# Patient Record
Sex: MALE | Race: BLACK OR AFRICAN AMERICAN | NOT HISPANIC OR LATINO | URBAN - METROPOLITAN AREA
[De-identification: names, ages, dates, MRNs, and addresses within clinical notes are randomized per-mention and may not be internally consistent; named-entity substitution may affect disease eponyms.]

---

## 2023-03-20 ENCOUNTER — OUTPATIENT (OUTPATIENT)
Dept: OUTPATIENT SERVICES | Facility: HOSPITAL | Age: 53
LOS: 1 days | End: 2023-03-20

## 2023-03-20 ENCOUNTER — APPOINTMENT (OUTPATIENT)
Dept: CT IMAGING | Facility: CLINIC | Age: 53
End: 2023-03-20
Payer: COMMERCIAL

## 2023-03-20 PROCEDURE — 75574 CT ANGIO HRT W/3D IMAGE: CPT | Mod: 26

## 2023-03-21 PROBLEM — Z00.00 ENCOUNTER FOR PREVENTIVE HEALTH EXAMINATION: Status: ACTIVE | Noted: 2023-03-21

## 2023-08-01 VITALS
RESPIRATION RATE: 16 BRPM | DIASTOLIC BLOOD PRESSURE: 83 MMHG | HEART RATE: 66 BPM | OXYGEN SATURATION: 98 % | WEIGHT: 167.99 LBS | HEIGHT: 68 IN | SYSTOLIC BLOOD PRESSURE: 143 MMHG | TEMPERATURE: 98 F

## 2023-08-01 NOTE — H&P ADULT - NSHPLABSRESULTS_GEN_ALL_CORE
12.8   5.85  )-----------( 202      ( 04 Aug 2023 07:27 )             38.3       08-04    142  |  106  |  11  ----------------------------<  111<H>  3.6   |  25  |  1.02    Ca    8.7      04 Aug 2023 07:27  Mg     1.7     08-04    TPro  7.1  /  Alb  4.3  /  TBili  0.2  /  DBili  x   /  AST  16  /  ALT  15  /  AlkPhos  61  08-04      PT/INR - ( 04 Aug 2023 07:27 )   PT: 11.1 sec;   INR: 0.97          PTT - ( 04 Aug 2023 07:27 )  PTT:29.3 sec          Urinalysis Basic - ( 04 Aug 2023 07:27 )    Color: x / Appearance: x / SG: x / pH: x  Gluc: 111 mg/dL / Ketone: x  / Bili: x / Urobili: x   Blood: x / Protein: x / Nitrite: x   Leuk Esterase: x / RBC: x / WBC x   Sq Epi: x / Non Sq Epi: x / Bacteria: x        EKG: NSR, no acute findings

## 2023-08-01 NOTE — H&P ADULT - ASSESSMENT
53 yo M, current everyday smoker, with a PMH of HTN, HLD, anomalous RCA, PAD with previous (L) SFA CSI/PTA/MILLI, (L) prox popiteal 80% s/p CSI PTA, (L) ANGEL 80-90% CSI/PTA and (R) EIA s/p PTA with residual (L) EIA disease who presented to outpatient cardiologist Dr. Armstrong with complaints of moderate GARCIA to light exertion, relieved w/ rest over the past few months and severe b/l LE pain/heaviness/weakness on ambulation of <1 block, with no relief. Pain is worse in the R vs L leg currently despite therapy w/ ASA/Cilostazol and exercise Rx. patient was sent for PINA and LE Duplex which were abnormal b/l (result below). In light of patients risk factors, [place] class IV sx and known residual disease, patient is referred for peripheral angiogram with planned intervention of known residual disease.     -Pt H+H, platelets stable, Pt without any reports of BRBPR, hematuria, prior ICH, melena and no recent or previous GI bleed.   -Loaded with: [x ]81mg ASA, [x ]75mg Plavix,  [ ] ASA 325mg [ ] Plavix 600mg, [ ] No Load [ ]. due to pt reporting compliance with aspirin (in last 4 days) and plavix (however, missed one dose in last 8 days). Per MD, okay to load with maintenance dose aspirin 81mg po x 1 and plavix 75mg po x 1.   -Pt Cr. stable- and LVEF 50% (per echo 12/2022, and EF 48% per NST in 2/23), pre cath fluids ordered per protocol [x]250ml IV bolus over 30 min, [x ] 75cc x2hrs, [ ] 50cc x2hrs, Patient euvolemic on exam.   -Malampatti II  -ASA III    Pt is a Candidate for Moderate Sedation, yes    Risks & benefits of procedure and alternative therapy have been explained to the patient including but not limited to: allergic reaction, bleeding w/possible need for blood transfusion, infection, renal and vascular compromise, limb damage, arrhythmia, stroke, vessel dissection/perforation, Myocardial infarction, emergent CABG. Informed consent obtained and in chart

## 2023-08-01 NOTE — H&P ADULT - HISTORY OF PRESENT ILLNESS
Cardiologist: Dr. Armstrong  Escort:  Pharmacy: [facility] [phone]    *Verify Meds*    53 yo M, current everyday smoker, with a PMH of HTN, HLD, anomalous RCA, PAD with previous known  who presented to outpatient cardiologist Dr. Armstrong with complaints of moderate GARCIA to light exertion, relieved w/ rest over the past few months and severe b/l LE pain/heaviness/weakness on ambulation of <1 block, with no relief    Peripheral Angiogram (Left) 1/19/23 : L EIA 80%, Common femoral mild, Profunda mild, femoral artery 100% stenosis/calcific, prox popiteal 80%, p/d ANGEL 80-90%, PTA TP trunk 90%, mPTA 90%, Peroneal artery 100% stenosis. INTERVENTION: (L) SFA CSI/PTA/MILLI, (L) prox popiteal 80% s/p CSI PTA, (L) ANGEL 80-90% CSI/PTA. Plan to c/w therapy of ASA/Plavix (stopped Xarelto) w/ staged (L) EIA once clinically appropriate    PINA 6/29/23: (RIGHT) Resting PINA and waveforms demonstrate mild (0.7-0.89) arterial disease at rest, TBI demonstrates no evidence of arterial disease. (LEFT) Resting PINA and waveforms demonstrate no significant evidence of arterial disease at rest; TBI demonstrates no evidence of arterial disease. Rec LE Duplex.    Lower Extremity Arterial Duplex 6/29/23: moderate 50-75% stenosis in (R) common femoral, deep femoral, posterior tibial and peroneal artery. Severe stenosis 75-99% stenosis in (R) superficial femoral artery and distal anterior tibial artery, occluded stenosis in the (R) anterior tibial artery, moderate 50-75% stenosis in (L) popiteal artery, occluded stenosis (L) peroneal, sterial dissection from (R) EIA to SFA/PFA bifurcation patent (L) SFA stent    Echocardiogram 12/1/22: EF 65%,  Cardiologist: Dr. Armstrong  Escort:  Pharmacy: [facility] [phone]    *Verify Meds*    51 yo M, current everyday smoker, with a PMH of HTN, HLD, anomalous RCA, PAD with previous (L) SFA CSI/PTA/MILLI, (L) prox popiteal 80% s/p CSI PTA, (L) ANGEL 80-90% CSI/PTA with residual (L) EIA disease who presented to outpatient cardiologist Dr. Armstrong with complaints of moderate GARCIA to light exertion, relieved w/ rest over the past few months and severe b/l LE pain/heaviness/weakness on ambulation of <1 block, with no relief    Peripheral Angiogram (Left) 1/19/23 : L EIA 80%, Common femoral mild, Profunda mild, femoral artery 100% stenosis/calcific, prox popiteal 80%, p/d ANGEL 80-90%, PTA TP trunk 90%, mPTA 90%, Peroneal artery 100% stenosis. INTERVENTION: (L) SFA CSI/PTA/MILLI, (L) prox popiteal 80% s/p CSI PTA, (L) ANGEL 80-90% CSI/PTA. Plan to c/w therapy of ASA/Plavix (stopped Xarelto) w/ staged (L) EIA once clinically appropriate    PINA 6/29/23: (RIGHT) Resting PINA and waveforms demonstrate mild (0.7-0.89) arterial disease at rest, TBI demonstrates no evidence of arterial disease. (LEFT) Resting PINA and waveforms demonstrate no significant evidence of arterial disease at rest; TBI demonstrates no evidence of arterial disease. Rec LE Duplex.    Lower Extremity Arterial Duplex 6/29/23: moderate 50-75% stenosis in (R) common femoral, deep femoral, posterior tibial and peroneal artery. Severe stenosis 75-99% stenosis in (R) superficial femoral artery and distal anterior tibial artery, occluded stenosis in the (R) anterior tibial artery, moderate 50-75% stenosis in (L) popiteal artery, occluded stenosis (L) peroneal, stable dissection from (R) EIA to SFA/PFA bifurcation patent (L) SFA stent    Echocardiogram 12/1/22: EF 65%, normal diastolic function, no valvular disease  Cardiologist: Dr. Armstrong  Escort:  Pharmacy: [facility] [phone]    *Verify Meds*  Of note: patient had NST in Feb (below) which was abnormal -- he's currently booked for peripheral only    51 yo M, current everyday smoker, with a PMH of HTN, HLD, anomalous RCA, PAD with previous (L) SFA CSI/PTA/MILLI, (L) prox popiteal 80% s/p CSI PTA, (L) ANGEL 80-90% CSI/PTA and (R) EIA s/p PTA with residual (L) EIA disease who presented to outpatient cardiologist Dr. Armstrong with complaints of moderate GARCIA to light exertion, relieved w/ rest over the past few months and severe b/l LE pain/heaviness/weakness on ambulation of <1 block, with no relief. Pain is worse in the R vs L leg currently despite therapy w/ ASA/Cilostazol and exercise Rx. patient was sent for PINA and LE Duplex which were abnormal b/l (result below). In light of patients risk factors, [place] class IV sx and known residual disease, patient is referred for peripheral angiogram with planned intervention of known residual disease.     Peripheral Angiogram (Left) 1/19/23 : L EIA 80%, Common femoral mild, Profunda mild, femoral artery 100% stenosis/calcific, prox popiteal 80%, p/d ANGEL 80-90%, PTA TP trunk 90%, mPTA 90%, Peroneal artery 100% stenosis. INTERVENTION: (L) SFA CSI/PTA/MILLI, (L) prox popiteal 80% s/p CSI PTA, (L) ANGEL 80-90% CSI/PTA. Plan to c/w therapy of ASA/Plavix (stopped Xarelto) w/ staged (L) EIA once clinically appropriate    PINA 6/29/23: (RIGHT) Resting PINA and waveforms demonstrate mild (0.7-0.89) arterial disease at rest, TBI demonstrates no evidence of arterial disease. (LEFT) Resting PINA and waveforms demonstrate no significant evidence of arterial disease at rest; TBI demonstrates no evidence of arterial disease. Rec LE Duplex.    Lower Extremity Arterial Duplex 6/29/23: moderate 50-75% stenosis in (R) common femoral, deep femoral, posterior tibial and peroneal artery. Severe stenosis 75-99% stenosis in (R) superficial femoral artery and distal anterior tibial artery, occluded stenosis in the (R) anterior tibial artery, moderate 50-75% stenosis in (L) popiteal artery, occluded stenosis (L) peroneal, stable dissection from (R) EIA to SFA/PFA bifurcation patent (L) SFA stent    NST 2/24/23: small perfusion abnormality of mild intensity in the inferoapical regions, post stress EF 48%, global hypokinesis     Echocardiogram 12/1/22: EF 65%, normal diastolic function, no valvular disease  Cardiologist: Dr. Armstrong  Escort: Sister/Cousin  Pharmacy: [facility] [phone]      Of note: patient had NST in Feb (below) which was abnormal -- he's currently booked for peripheral only    51 yo M, current everyday smoker, with a PMH of HTN, HLD, anomalous RCA, PAD with previous (L) SFA CSI/PTA/MILLI, (L) prox popiteal 80% s/p CSI PTA, (L) ANGEL 80-90% CSI/PTA and (R) EIA s/p PTA with residual (L) EIA disease who presented to outpatient cardiologist Dr. Armstrong with complaints of moderate GARCIA to light exertion, relieved w/ rest over the past few months and severe b/l LE pain/heaviness/weakness on ambulation of <1 block, with no relief. Pain is worse in the R vs L leg currently despite therapy w/ ASA/Cilostazol and exercise Rx. patient was sent for PINA and LE Duplex which were abnormal b/l (result below). In light of patients risk factors, [place] class IV sx and known residual disease, patient is referred for peripheral angiogram with planned intervention of known residual disease.     Peripheral Angiogram (Left) 1/19/23 : L EIA 80%, Common femoral mild, Profunda mild, femoral artery 100% stenosis/calcific, prox popiteal 80%, p/d ANGEL 80-90%, PTA TP trunk 90%, mPTA 90%, Peroneal artery 100% stenosis. INTERVENTION: (L) SFA CSI/PTA/MILLI, (L) prox popiteal 80% s/p CSI PTA, (L) ANGEL 80-90% CSI/PTA. Plan to c/w therapy of ASA/Plavix (stopped Xarelto) w/ staged (L) EIA once clinically appropriate    PINA 6/29/23: (RIGHT) Resting PINA and waveforms demonstrate mild (0.7-0.89) arterial disease at rest, TBI demonstrates no evidence of arterial disease. (LEFT) Resting PINA and waveforms demonstrate no significant evidence of arterial disease at rest; TBI demonstrates no evidence of arterial disease. Rec LE Duplex.    Lower Extremity Arterial Duplex 6/29/23: moderate 50-75% stenosis in (R) common femoral, deep femoral, posterior tibial and peroneal artery. Severe stenosis 75-99% stenosis in (R) superficial femoral artery and distal anterior tibial artery, occluded stenosis in the (R) anterior tibial artery, moderate 50-75% stenosis in (L) popiteal artery, occluded stenosis (L) peroneal, stable dissection from (R) EIA to SFA/PFA bifurcation patent (L) SFA stent    NST 2/24/23: small perfusion abnormality of mild intensity in the inferoapical regions, post stress EF 48%, global hypokinesis     Echocardiogram 12/1/22: EF 65%, normal diastolic function, no valvular disease

## 2023-08-04 ENCOUNTER — OUTPATIENT (OUTPATIENT)
Dept: OUTPATIENT SERVICES | Facility: HOSPITAL | Age: 53
LOS: 1 days | Discharge: HOME | End: 2023-08-04
Payer: COMMERCIAL

## 2023-08-04 DIAGNOSIS — Z79.82 LONG TERM (CURRENT) USE OF ASPIRIN: ICD-10-CM

## 2023-08-04 DIAGNOSIS — F17.210 NICOTINE DEPENDENCE, CIGARETTES, UNCOMPLICATED: ICD-10-CM

## 2023-08-04 DIAGNOSIS — I73.9 PERIPHERAL VASCULAR DISEASE, UNSPECIFIED: ICD-10-CM

## 2023-08-04 DIAGNOSIS — I10 ESSENTIAL (PRIMARY) HYPERTENSION: ICD-10-CM

## 2023-08-04 DIAGNOSIS — Z98.62 PERIPHERAL VASCULAR ANGIOPLASTY STATUS: Chronic | ICD-10-CM

## 2023-08-04 DIAGNOSIS — E78.5 HYPERLIPIDEMIA, UNSPECIFIED: ICD-10-CM

## 2023-08-04 DIAGNOSIS — Z79.02 LONG TERM (CURRENT) USE OF ANTITHROMBOTICS/ANTIPLATELETS: ICD-10-CM

## 2023-08-04 LAB
A1C WITH ESTIMATED AVERAGE GLUCOSE RESULT: 5.8 % — HIGH (ref 4–5.6)
ALBUMIN SERPL ELPH-MCNC: 4.3 G/DL — SIGNIFICANT CHANGE UP (ref 3.3–5)
ALP SERPL-CCNC: 61 U/L — SIGNIFICANT CHANGE UP (ref 40–120)
ALT FLD-CCNC: 15 U/L — SIGNIFICANT CHANGE UP (ref 10–45)
ANION GAP SERPL CALC-SCNC: 11 MMOL/L — SIGNIFICANT CHANGE UP (ref 5–17)
ANION GAP SERPL CALC-SCNC: 8 MMOL/L — SIGNIFICANT CHANGE UP (ref 5–17)
ANION GAP SERPL CALC-SCNC: 9 MMOL/L — SIGNIFICANT CHANGE UP (ref 5–17)
APTT BLD: 29.3 SEC — SIGNIFICANT CHANGE UP (ref 24.5–35.6)
AST SERPL-CCNC: 16 U/L — SIGNIFICANT CHANGE UP (ref 10–40)
BASOPHILS # BLD AUTO: 0.05 K/UL — SIGNIFICANT CHANGE UP (ref 0–0.2)
BASOPHILS NFR BLD AUTO: 0.9 % — SIGNIFICANT CHANGE UP (ref 0–2)
BILIRUB SERPL-MCNC: 0.2 MG/DL — SIGNIFICANT CHANGE UP (ref 0.2–1.2)
BUN SERPL-MCNC: 11 MG/DL — SIGNIFICANT CHANGE UP (ref 7–23)
BUN SERPL-MCNC: 9 MG/DL — SIGNIFICANT CHANGE UP (ref 7–23)
BUN SERPL-MCNC: 9 MG/DL — SIGNIFICANT CHANGE UP (ref 7–23)
CALCIUM SERPL-MCNC: 8.6 MG/DL — SIGNIFICANT CHANGE UP (ref 8.4–10.5)
CALCIUM SERPL-MCNC: 8.7 MG/DL — SIGNIFICANT CHANGE UP (ref 8.4–10.5)
CALCIUM SERPL-MCNC: 8.7 MG/DL — SIGNIFICANT CHANGE UP (ref 8.4–10.5)
CHLORIDE SERPL-SCNC: 104 MMOL/L — SIGNIFICANT CHANGE UP (ref 96–108)
CHLORIDE SERPL-SCNC: 106 MMOL/L — SIGNIFICANT CHANGE UP (ref 96–108)
CHLORIDE SERPL-SCNC: 106 MMOL/L — SIGNIFICANT CHANGE UP (ref 96–108)
CHOLEST SERPL-MCNC: 112 MG/DL — SIGNIFICANT CHANGE UP
CO2 SERPL-SCNC: 25 MMOL/L — SIGNIFICANT CHANGE UP (ref 22–31)
CO2 SERPL-SCNC: 25 MMOL/L — SIGNIFICANT CHANGE UP (ref 22–31)
CO2 SERPL-SCNC: 28 MMOL/L — SIGNIFICANT CHANGE UP (ref 22–31)
CREAT SERPL-MCNC: 0.91 MG/DL — SIGNIFICANT CHANGE UP (ref 0.5–1.3)
CREAT SERPL-MCNC: 1.02 MG/DL — SIGNIFICANT CHANGE UP (ref 0.5–1.3)
CREAT SERPL-MCNC: 1.07 MG/DL — SIGNIFICANT CHANGE UP (ref 0.5–1.3)
EGFR: 101 ML/MIN/1.73M2 — SIGNIFICANT CHANGE UP
EGFR: 84 ML/MIN/1.73M2 — SIGNIFICANT CHANGE UP
EGFR: 88 ML/MIN/1.73M2 — SIGNIFICANT CHANGE UP
EOSINOPHIL # BLD AUTO: 0.14 K/UL — SIGNIFICANT CHANGE UP (ref 0–0.5)
EOSINOPHIL NFR BLD AUTO: 2.4 % — SIGNIFICANT CHANGE UP (ref 0–6)
ESTIMATED AVERAGE GLUCOSE: 120 MG/DL — HIGH (ref 68–114)
GLUCOSE SERPL-MCNC: 100 MG/DL — HIGH (ref 70–99)
GLUCOSE SERPL-MCNC: 111 MG/DL — HIGH (ref 70–99)
GLUCOSE SERPL-MCNC: 86 MG/DL — SIGNIFICANT CHANGE UP (ref 70–99)
HCT VFR BLD CALC: 38 % — LOW (ref 39–50)
HCT VFR BLD CALC: 38.3 % — LOW (ref 39–50)
HDLC SERPL-MCNC: 33 MG/DL — LOW
HGB BLD-MCNC: 12.8 G/DL — LOW (ref 13–17)
HGB BLD-MCNC: 13 G/DL — SIGNIFICANT CHANGE UP (ref 13–17)
IMM GRANULOCYTES NFR BLD AUTO: 0.2 % — SIGNIFICANT CHANGE UP (ref 0–0.9)
INR BLD: 0.97 — SIGNIFICANT CHANGE UP (ref 0.85–1.18)
LIPID PNL WITH DIRECT LDL SERPL: 53 MG/DL — SIGNIFICANT CHANGE UP
LYMPHOCYTES # BLD AUTO: 1.99 K/UL — SIGNIFICANT CHANGE UP (ref 1–3.3)
LYMPHOCYTES # BLD AUTO: 34 % — SIGNIFICANT CHANGE UP (ref 13–44)
MAGNESIUM SERPL-MCNC: 1.7 MG/DL — SIGNIFICANT CHANGE UP (ref 1.6–2.6)
MAGNESIUM SERPL-MCNC: 1.8 MG/DL — SIGNIFICANT CHANGE UP (ref 1.6–2.6)
MCHC RBC-ENTMCNC: 29.8 PG — SIGNIFICANT CHANGE UP (ref 27–34)
MCHC RBC-ENTMCNC: 30.3 PG — SIGNIFICANT CHANGE UP (ref 27–34)
MCHC RBC-ENTMCNC: 33.4 GM/DL — SIGNIFICANT CHANGE UP (ref 32–36)
MCHC RBC-ENTMCNC: 34.2 GM/DL — SIGNIFICANT CHANGE UP (ref 32–36)
MCV RBC AUTO: 88.6 FL — SIGNIFICANT CHANGE UP (ref 80–100)
MCV RBC AUTO: 89.3 FL — SIGNIFICANT CHANGE UP (ref 80–100)
MONOCYTES # BLD AUTO: 0.34 K/UL — SIGNIFICANT CHANGE UP (ref 0–0.9)
MONOCYTES NFR BLD AUTO: 5.8 % — SIGNIFICANT CHANGE UP (ref 2–14)
NEUTROPHILS # BLD AUTO: 3.32 K/UL — SIGNIFICANT CHANGE UP (ref 1.8–7.4)
NEUTROPHILS NFR BLD AUTO: 56.7 % — SIGNIFICANT CHANGE UP (ref 43–77)
NON HDL CHOLESTEROL: 79 MG/DL — SIGNIFICANT CHANGE UP
NRBC # BLD: 0 /100 WBCS — SIGNIFICANT CHANGE UP (ref 0–0)
NRBC # BLD: 0 /100 WBCS — SIGNIFICANT CHANGE UP (ref 0–0)
PLATELET # BLD AUTO: 179 K/UL — SIGNIFICANT CHANGE UP (ref 150–400)
PLATELET # BLD AUTO: 202 K/UL — SIGNIFICANT CHANGE UP (ref 150–400)
POTASSIUM SERPL-MCNC: 3.6 MMOL/L — SIGNIFICANT CHANGE UP (ref 3.5–5.3)
POTASSIUM SERPL-MCNC: 4.1 MMOL/L — SIGNIFICANT CHANGE UP (ref 3.5–5.3)
POTASSIUM SERPL-MCNC: SIGNIFICANT CHANGE UP (ref 3.5–5.3)
POTASSIUM SERPL-SCNC: 3.6 MMOL/L — SIGNIFICANT CHANGE UP (ref 3.5–5.3)
POTASSIUM SERPL-SCNC: 4.1 MMOL/L — SIGNIFICANT CHANGE UP (ref 3.5–5.3)
POTASSIUM SERPL-SCNC: SIGNIFICANT CHANGE UP (ref 3.5–5.3)
PROT SERPL-MCNC: 7.1 G/DL — SIGNIFICANT CHANGE UP (ref 6–8.3)
PROTHROM AB SERPL-ACNC: 11.1 SEC — SIGNIFICANT CHANGE UP (ref 9.5–13)
RBC # BLD: 4.29 M/UL — SIGNIFICANT CHANGE UP (ref 4.2–5.8)
RBC # BLD: 4.29 M/UL — SIGNIFICANT CHANGE UP (ref 4.2–5.8)
RBC # FLD: 14 % — SIGNIFICANT CHANGE UP (ref 10.3–14.5)
RBC # FLD: 14.1 % — SIGNIFICANT CHANGE UP (ref 10.3–14.5)
SODIUM SERPL-SCNC: 138 MMOL/L — SIGNIFICANT CHANGE UP (ref 135–145)
SODIUM SERPL-SCNC: 142 MMOL/L — SIGNIFICANT CHANGE UP (ref 135–145)
SODIUM SERPL-SCNC: 142 MMOL/L — SIGNIFICANT CHANGE UP (ref 135–145)
TRIGL SERPL-MCNC: 129 MG/DL — SIGNIFICANT CHANGE UP
WBC # BLD: 5.85 K/UL — SIGNIFICANT CHANGE UP (ref 3.8–10.5)
WBC # BLD: 6.64 K/UL — SIGNIFICANT CHANGE UP (ref 3.8–10.5)
WBC # FLD AUTO: 5.85 K/UL — SIGNIFICANT CHANGE UP (ref 3.8–10.5)
WBC # FLD AUTO: 6.64 K/UL — SIGNIFICANT CHANGE UP (ref 3.8–10.5)

## 2023-08-04 PROCEDURE — 85610 PROTHROMBIN TIME: CPT

## 2023-08-04 PROCEDURE — C9764: CPT | Mod: LT

## 2023-08-04 PROCEDURE — C2623: CPT

## 2023-08-04 PROCEDURE — C1894: CPT

## 2023-08-04 PROCEDURE — 75716 ARTERY X-RAYS ARMS/LEGS: CPT | Mod: 59

## 2023-08-04 PROCEDURE — 37224: CPT | Mod: RT

## 2023-08-04 PROCEDURE — 75716 ARTERY X-RAYS ARMS/LEGS: CPT | Mod: 26,59

## 2023-08-04 PROCEDURE — 85025 COMPLETE CBC W/AUTO DIFF WBC: CPT

## 2023-08-04 PROCEDURE — 80048 BASIC METABOLIC PNL TOTAL CA: CPT

## 2023-08-04 PROCEDURE — 83735 ASSAY OF MAGNESIUM: CPT

## 2023-08-04 PROCEDURE — C1725: CPT

## 2023-08-04 PROCEDURE — C1887: CPT

## 2023-08-04 PROCEDURE — C1876: CPT

## 2023-08-04 PROCEDURE — 80053 COMPREHEN METABOLIC PANEL: CPT

## 2023-08-04 PROCEDURE — 85730 THROMBOPLASTIN TIME PARTIAL: CPT

## 2023-08-04 PROCEDURE — 37228: CPT | Mod: RT

## 2023-08-04 PROCEDURE — 85027 COMPLETE CBC AUTOMATED: CPT

## 2023-08-04 PROCEDURE — 83036 HEMOGLOBIN GLYCOSYLATED A1C: CPT

## 2023-08-04 PROCEDURE — C1760: CPT

## 2023-08-04 PROCEDURE — 37221: CPT

## 2023-08-04 PROCEDURE — C1769: CPT

## 2023-08-04 PROCEDURE — 37224: CPT

## 2023-08-04 PROCEDURE — 36415 COLL VENOUS BLD VENIPUNCTURE: CPT

## 2023-08-04 PROCEDURE — 80061 LIPID PANEL: CPT

## 2023-08-04 PROCEDURE — 37228: CPT

## 2023-08-04 RX ORDER — CLOPIDOGREL BISULFATE 75 MG/1
150 TABLET, FILM COATED ORAL ONCE
Refills: 0 | Status: DISCONTINUED | OUTPATIENT
Start: 2023-08-04 | End: 2023-08-04

## 2023-08-04 RX ORDER — ASPIRIN/CALCIUM CARB/MAGNESIUM 324 MG
1 TABLET ORAL
Qty: 30 | Refills: 11
Start: 2023-08-04 | End: 2024-07-28

## 2023-08-04 RX ORDER — ASPIRIN/CALCIUM CARB/MAGNESIUM 324 MG
81 TABLET ORAL ONCE
Refills: 0 | Status: COMPLETED | OUTPATIENT
Start: 2023-08-04 | End: 2023-08-04

## 2023-08-04 RX ORDER — SODIUM CHLORIDE 9 MG/ML
500 INJECTION INTRAMUSCULAR; INTRAVENOUS; SUBCUTANEOUS
Refills: 0 | Status: DISCONTINUED | OUTPATIENT
Start: 2023-08-04 | End: 2023-08-04

## 2023-08-04 RX ORDER — SODIUM CHLORIDE 9 MG/ML
250 INJECTION INTRAMUSCULAR; INTRAVENOUS; SUBCUTANEOUS ONCE
Refills: 0 | Status: COMPLETED | OUTPATIENT
Start: 2023-08-04 | End: 2023-08-04

## 2023-08-04 RX ORDER — CLOPIDOGREL BISULFATE 75 MG/1
1 TABLET, FILM COATED ORAL
Qty: 30 | Refills: 11
Start: 2023-08-04 | End: 2024-07-28

## 2023-08-04 RX ORDER — POTASSIUM CHLORIDE 20 MEQ
40 PACKET (EA) ORAL ONCE
Refills: 0 | Status: COMPLETED | OUTPATIENT
Start: 2023-08-04 | End: 2023-08-04

## 2023-08-04 RX ORDER — MAGNESIUM OXIDE 400 MG ORAL TABLET 241.3 MG
800 TABLET ORAL ONCE
Refills: 0 | Status: COMPLETED | OUTPATIENT
Start: 2023-08-04 | End: 2023-08-04

## 2023-08-04 RX ORDER — CLOPIDOGREL BISULFATE 75 MG/1
75 TABLET, FILM COATED ORAL ONCE
Refills: 0 | Status: COMPLETED | OUTPATIENT
Start: 2023-08-04 | End: 2023-08-04

## 2023-08-04 RX ORDER — SODIUM CHLORIDE 9 MG/ML
500 INJECTION INTRAMUSCULAR; INTRAVENOUS; SUBCUTANEOUS
Refills: 0 | Status: DISCONTINUED | OUTPATIENT
Start: 2023-08-04 | End: 2023-08-18

## 2023-08-04 RX ADMIN — Medication 40 MILLIEQUIVALENT(S): at 09:25

## 2023-08-04 RX ADMIN — SODIUM CHLORIDE 75 MILLILITER(S): 9 INJECTION INTRAMUSCULAR; INTRAVENOUS; SUBCUTANEOUS at 09:20

## 2023-08-04 RX ADMIN — Medication 81 MILLIGRAM(S): at 09:19

## 2023-08-04 RX ADMIN — MAGNESIUM OXIDE 400 MG ORAL TABLET 800 MILLIGRAM(S): 241.3 TABLET ORAL at 09:29

## 2023-08-04 RX ADMIN — SODIUM CHLORIDE 100 MILLILITER(S): 9 INJECTION INTRAMUSCULAR; INTRAVENOUS; SUBCUTANEOUS at 14:13

## 2023-08-04 RX ADMIN — SODIUM CHLORIDE 500 MILLILITER(S): 9 INJECTION INTRAMUSCULAR; INTRAVENOUS; SUBCUTANEOUS at 09:20

## 2023-08-04 RX ADMIN — CLOPIDOGREL BISULFATE 75 MILLIGRAM(S): 75 TABLET, FILM COATED ORAL at 09:29

## 2023-08-04 NOTE — PROGRESS NOTE ADULT - SUBJECTIVE AND OBJECTIVE BOX
Interventional Cardiology PA Post PCI SDA Discharge Note      Patient without complaints.    Afebrile, VSS    Ext:    		Left             Groin:    no   hematoma,   no  bruit, dressing; C/D/I  	    Pulses:    intact DP/PT to baseline     A/P:  s/p peripheral cath 8/4/23: DCB to Right T-P trunk, DCB X 3 p/m/d RSFA, BMS to L extrenal Illiac. L groin PC.     1.	Follow-up with PMD/Cardiologist _Dr. Armstrong__________ in 72 hours.  2.                 Post procedure labs reviewed and stable.    3.                 Pt given instructions on importance of taking antiplatelet medication.    4. 	Stable for discharge as per attending  _Nathaniel________ after bed rest, pt voids, groin  stable and 30 minutes of ambulation.  5.                 Prescriptions for Aspirin/Plavix  e-prescribed and submitted to patient's pharmacy.    6.                 Patient will continue statin (Lipitor 40 mg daily).   7.	Discharge forms signed and copies in chart

## 2023-08-07 RX ORDER — AMLODIPINE BESYLATE 2.5 MG/1
1 TABLET ORAL
Refills: 0 | DISCHARGE

## 2023-08-07 RX ORDER — ATORVASTATIN CALCIUM 80 MG/1
1 TABLET, FILM COATED ORAL
Refills: 0 | DISCHARGE

## 2023-08-07 RX ORDER — ASPIRIN/CALCIUM CARB/MAGNESIUM 324 MG
1 TABLET ORAL
Refills: 0 | DISCHARGE

## 2023-08-07 RX ORDER — CLOPIDOGREL BISULFATE 75 MG/1
1 TABLET, FILM COATED ORAL
Refills: 0 | DISCHARGE